# Patient Record
Sex: FEMALE
[De-identification: names, ages, dates, MRNs, and addresses within clinical notes are randomized per-mention and may not be internally consistent; named-entity substitution may affect disease eponyms.]

---

## 2017-07-17 ENCOUNTER — HOSPITAL ENCOUNTER (OUTPATIENT)
Dept: HOSPITAL 31 - C.SDS | Age: 62
Discharge: HOME | End: 2017-07-17
Attending: SURGERY
Payer: MEDICARE

## 2017-07-17 VITALS
HEART RATE: 84 BPM | TEMPERATURE: 98 F | DIASTOLIC BLOOD PRESSURE: 71 MMHG | OXYGEN SATURATION: 100 % | SYSTOLIC BLOOD PRESSURE: 111 MMHG

## 2017-07-17 VITALS — BODY MASS INDEX: 26.6 KG/M2

## 2017-07-17 VITALS — RESPIRATION RATE: 18 BRPM

## 2017-07-17 DIAGNOSIS — K40.90: Primary | ICD-10-CM

## 2017-07-17 RX ADMIN — CEFAZOLIN SODIUM ONE MLS: 1 SOLUTION INTRAVENOUS at 08:08

## 2017-07-17 RX ADMIN — HYDROMORPHONE HYDROCHLORIDE PRN MG: 1 INJECTION, SOLUTION INTRAMUSCULAR; INTRAVENOUS; SUBCUTANEOUS at 09:29

## 2017-07-17 RX ADMIN — HYDROMORPHONE HYDROCHLORIDE PRN MG: 1 INJECTION, SOLUTION INTRAMUSCULAR; INTRAVENOUS; SUBCUTANEOUS at 09:51

## 2017-07-17 RX ADMIN — CEFAZOLIN SODIUM ONE MLS: 1 SOLUTION INTRAVENOUS at 08:00

## 2017-07-17 NOTE — PCM.SURG1
Surgeon's Initial Post Op Note





- Surgeon's Notes


Surgeon: Dr. Lopez


Assistant: Dr. Villarreal PGY-3, Dr. Garcia PGY-2


Type of Anesthesia: General Endo


Pre-Operative Diagnosis: left inguinal hernia


Operative Findings: see operative report


Post-Operative Diagnosis: same


Operation Performed: left inguinal hernia repair w/ mesh placement


Specimen/Specimens Removed: none


Estimated Blood Loss: EBL {In ML}: 20


Blood Products Given: N/A


Drains Used: No Drains


Post-Op Condition: Good


Date of Surgery/Procedure: 07/17/17


Time of Surgery/Procedure: 09:23

## 2017-07-18 NOTE — OP
PROCEDURE DATE:  07/17/2017



PREOPERATIVE DIAGNOSIS:  Left inguinal hernia.



POSTOPERATIVE DIAGNOSIS:  Left inguinal hernia.



PROCEDURE CARRIED OUT:  Repair of left inguinal hernia with mesh, Prolene

Hernia System.



SURGEON:  Dr. Alfredo Lopez Jr., MD



ESTIMATED BLOOD LOSS:  Approximately 20 mL.



INDICATIONS FOR THE PROCEDURE:  The patient is a 61-year-old woman with

pain in the left groin.  Clinical exam confirmed the presence of the hernia

as well as the CAT scan.



OPERATIVE FINDINGS:  The defect was very well defined.  A Prolene Hernia

System was placed into the defect after the sac had been dissected free

from the surrounding tissues.  The bottom part was unfurled.  The mesh was

then deployed on top of this and in between connecting piece was sutured to

the surrounding fascia.



DESCRIPTION OF PROCEDURE:  The patient was given general anesthesia and

intravenous antibiotics.  The area was prepped and draped with Betadine. 

The skin drape applied on top of it.  Incision was made after Marcaine was

given and the tissue was dissected free.  The findings were as mentioned. 

The mesh was implanted, secured in place with staples and 5-0 nylon

sutures.  Blood loss to the procedure was 20 mL.  There were no operative

complications or problems.  Operation carried out was repair of left

inguinal hernia with mesh.







__________________________________________

Alfredo Lopez Jr., MD



cc:  Melvin Brito MD



DD:  07/17/2017 9:18:49

DT:  07/17/2017 11:28:28

Job # [id number]

## 2018-11-14 ENCOUNTER — HOSPITAL ENCOUNTER (EMERGENCY)
Dept: HOSPITAL 31 - C.ER | Age: 63
Discharge: HOME | End: 2018-11-14
Payer: MEDICARE

## 2018-11-14 VITALS
HEART RATE: 88 BPM | SYSTOLIC BLOOD PRESSURE: 120 MMHG | TEMPERATURE: 97.1 F | RESPIRATION RATE: 20 BRPM | DIASTOLIC BLOOD PRESSURE: 75 MMHG

## 2018-11-14 VITALS — OXYGEN SATURATION: 100 %

## 2018-11-14 VITALS — BODY MASS INDEX: 26.6 KG/M2

## 2018-11-14 DIAGNOSIS — S83.92XA: Primary | ICD-10-CM

## 2018-11-14 DIAGNOSIS — S83.91XA: ICD-10-CM
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
## 2018-11-14 NOTE — C.PDOC
History Of Present Illness


63 year old female presents to the ED for evaluation of bilateral knee pain. 

Patient states she tripped and fell onto her knees one week ago. She was 

evaluated by her PMD yesterday evening, and was instructed to come to the ED for

an x-ray. Patient states her pain is worse with ambulation, but she is able to 

bear weight. Patient states she is up-to-date with Tetanus shot and denies any 

other injuries at this time. 


Time Seen by Provider: 11/14/18 10:24


Chief Complaint (Nursing): Lower Extremity Problem/Injury


History Per: Patient


History/Exam Limitations: no limitations


Current Symptoms Are (Timing): Still Present


Additional History Per: Patient





- Knee


Description Of Injury: Fell





Past Medical History


Reviewed: Historical Data, Nursing Documentation, Vital Signs


Vital Signs: 





                                Last Vital Signs











Temp  98.5 F   11/14/18 10:16


 


Pulse  99 H  11/14/18 10:16


 


Resp  18   11/14/18 10:16


 


BP  143/84   11/14/18 10:16


 


Pulse Ox  100   11/14/18 10:16














- Medical History


PMH: Anxiety, Hypercholesterolemia


   Denies: Fractures


Surgical History: Appendectomy (AGE 11), Endoscopy


Family History: States: Unknown Family Hx





- Social History


Hx Alcohol Use: No


Hx Substance Use: No





- Immunization History


Hx Tetanus Toxoid Vaccination: No


Hx Influenza Vaccination: No


Hx Pneumococcal Vaccination: No





Review Of Systems


Musculoskeletal: Positive for: Other (bilateral knee pain )





Physical Exam





- Physical Exam


Appears: Non-toxic, No Acute Distress


Skin: Normal Color, Warm, Dry, Other (small abrasion to left knee. no erythema )


Head: Atraumatic, Normacephalic


Eye(s): bilateral: Normal Inspection


Oral Mucosa: Moist


Neck: Supple


Extremity: Normal ROM, Tenderness (mild, to anterior aspect of bilateral knees 

), No Calf Tenderness, Capillary Refill (less than 2 seconds ), No Deformity, No

Swelling


Pulses: Left Dorsalis Pedis: Normal, Right Dorsalis Pedis: Normal


Neurological/Psych: Oriented x3, Normal Speech, Normal Cognition, Normal 

Sensation





ED Course And Treatment


O2 Sat by Pulse Oximetry: 100 (on RA)


Pulse Ox Interpretation: Normal





- Other Rad


  ** knee XR


X-Ray: Viewed By Me, Read By Radiologist


Interpretation: 11/14/2018.  PROCEDURE:  Bilateral Knee Radiographs.  HISTORY:  

B/L KNEE PAIN AFTER FALL.  COMPARISON:  None.  FINDINGS:  BONES:  Right Knee:  

Normal. No fracture.  Left Knee:  Normal. No fracture.  JOINTS:  Right Knee:  

Normal. No osteoarthritis.  Left knee: Normal. No osteoarthritis.  SOFT TISSUES:

 Right Knee: Normal.  Left Knee: Normal.  JOINT EFFUSION:  Right Knee: None.  

Left Knee: None.  OTHER FINDINGS:  None.  IMPRESSION:  Unremarkable radiographs 

of the knees.  No acute findings.


Progress Note: Bilateral knee XR ordered and reviewed. XR results are 

unremarkable.  Naproxen PO given.  Ace bandage applied by ED tech and was 

checked by me.  On reassessment, patient is resting comfortably, showing no 

signs of distress and reports an improvement in her symptoms. Patient will be 

discharged with Rx for Naproxen for pain and is advised to follow up with 

orthopedist within 1 week for further evaluation.





Disposition


Counseled Patient/Family Regarding: Studies Performed, Diagnosis, Need For 

Followup, Rx Given





- Disposition


Referrals: 


Haylee Houston MD [Staff Provider] - 


Disposition: HOME/ ROUTINE


Disposition Time: 12:00


Condition: STABLE


Additional Instructions: 


FOLLOW UP WITH ORTHOPEDICS WITHIN 1 WEEK





USE MEDICATIONS AS NEEDED FOR PAIN





RETURN TO ER IF SYMPTOMS WORSEN 


Prescriptions: 


Naproxen 375 mg PO BID PRN #20 tablet


 PRN Reason: pain


Instructions:  Knee Sprain (DC)


Forms:  [employer] (English)


Print Language: ENGLISH





- POA


Present On Arrival: Falls Or Trauma





- Clinical Impression


Clinical Impression: 


 Knee sprain, bilateral








- Scribe Statement


The provider has reviewed the documentation as recorded by the Scribe (Estela Horvath)


Provider Attestation: 








All medical record entries made by the Scribe were at my direction and 

personally dictated by me. I have reviewed the chart and agree that the record 

accurately reflects my personal performance of the history, physical exam, 

medical decision making, and the department course for this patient. I have also

personally directed, reviewed, and agree with the discharge instructions and 

disposition.

## 2018-11-14 NOTE — RAD
Date of service: 



11/14/2018



PROCEDURE:  Bilateral Knee Radiographs.



HISTORY:

B/L KNEE PAIN AFTER FALL



COMPARISON:

None.



FINDINGS:



BONES:

Right Knee:  Normal. No fracture. 



Left Knee:  Normal. No fracture. 



JOINTS:

Right Knee:  Normal. No osteoarthritis. 



Left knee: Normal. No osteoarthritis. 



SOFT TISSUES:

Right Knee: Normal.



Left Knee: Normal.



JOINT EFFUSION:

Right Knee: None. 



Left Knee: None.



OTHER FINDINGS:

None.



IMPRESSION:

Unremarkable radiographs of the knees.  No acute findings.